# Patient Record
Sex: FEMALE | Race: BLACK OR AFRICAN AMERICAN | NOT HISPANIC OR LATINO | URBAN - METROPOLITAN AREA
[De-identification: names, ages, dates, MRNs, and addresses within clinical notes are randomized per-mention and may not be internally consistent; named-entity substitution may affect disease eponyms.]

---

## 2024-07-03 ENCOUNTER — LAB OUTSIDE AN ENCOUNTER (OUTPATIENT)
Dept: URBAN - METROPOLITAN AREA CLINIC 88 | Facility: CLINIC | Age: 39
End: 2024-07-03

## 2024-07-03 ENCOUNTER — DASHBOARD ENCOUNTERS (OUTPATIENT)
Age: 39
End: 2024-07-03

## 2024-07-03 ENCOUNTER — OFFICE VISIT (OUTPATIENT)
Dept: URBAN - METROPOLITAN AREA CLINIC 88 | Facility: CLINIC | Age: 39
End: 2024-07-03
Payer: COMMERCIAL

## 2024-07-03 VITALS
HEIGHT: 60 IN | TEMPERATURE: 97.9 F | BODY MASS INDEX: 31.06 KG/M2 | DIASTOLIC BLOOD PRESSURE: 88 MMHG | OXYGEN SATURATION: 99 % | HEART RATE: 102 BPM | SYSTOLIC BLOOD PRESSURE: 152 MMHG | WEIGHT: 158.2 LBS

## 2024-07-03 DIAGNOSIS — R10.13 EPIGASTRIC ABDOMINAL PAIN: ICD-10-CM

## 2024-07-03 DIAGNOSIS — R63.4 WEIGHT LOSS, UNINTENTIONAL: ICD-10-CM

## 2024-07-03 DIAGNOSIS — R19.7 DIARRHEA OF PRESUMED INFECTIOUS ORIGIN: ICD-10-CM

## 2024-07-03 DIAGNOSIS — R11.0 NAUSEA: ICD-10-CM

## 2024-07-03 PROCEDURE — 99204 OFFICE O/P NEW MOD 45 MIN: CPT | Performed by: NURSE PRACTITIONER

## 2024-07-03 RX ORDER — ACYCLOVIR 400 MG/1
TAKE 1 TABLET (400 MG TOTAL) BY MOUTH IN THE MORNING AND BEFORE BEDTIME TABLET ORAL
Qty: 180 EACH | Refills: 1 | Status: ACTIVE | COMMUNITY

## 2024-07-03 RX ORDER — MV-MN/C/THEANINE/HERB NO.310 1000-200MG
AS DIRECTED POWDER IN PACKET (EA) ORAL
Status: ACTIVE | COMMUNITY

## 2024-07-03 RX ORDER — PANTOPRAZOLE SODIUM 40 MG/1
TABLET, DELAYED RELEASE ORAL
Qty: 30 TABLET | Status: ACTIVE | COMMUNITY

## 2024-07-03 RX ORDER — MONTELUKAST SODIUM 10 MG/1
TABLET, FILM COATED ORAL
Qty: 90 TABLET | Status: ACTIVE | COMMUNITY

## 2024-07-03 RX ORDER — PROMETHAZINE HYDROCHLORIDE 25 MG/1
TAKE 1 TABLET BY MOUTH EVERY 8 HOURS AS NEEDED TABLET ORAL
Qty: 60 EACH | Refills: 0 | Status: ACTIVE | COMMUNITY

## 2024-07-03 RX ORDER — CLINDAMYCIN PHOSPHATE TOPICAL SOLUTION USP, 1% 10 MG/ML
APPLY TO THE AFFECTED AREAS ON THE FACE ONCE DAILY SOLUTION TOPICAL
Qty: 60 MILLILITER | Refills: 0 | Status: ACTIVE | COMMUNITY

## 2024-07-03 RX ORDER — HYDROCORTISONE 25 MG/G
APPLY TO THE FACE UP TO TWICE PER DAY X2 WEEKS OINTMENT TOPICAL
Qty: 20 GRAM | Refills: 1 | Status: ACTIVE | COMMUNITY

## 2024-07-03 RX ORDER — RIMEGEPANT SULFATE 75 MG/75MG
TABLET, ORALLY DISINTEGRATING ORAL
Qty: 16 EACH | Status: ACTIVE | COMMUNITY

## 2024-07-03 RX ORDER — BUPROPION HYDROCHLORIDE 150 MG/1
TAKE 1 TABLET BY MOUTH EVERY DAY TABLET, FILM COATED, EXTENDED RELEASE ORAL
Qty: 90 EACH | Refills: 1 | Status: ACTIVE | COMMUNITY

## 2024-07-03 RX ORDER — ALBUTEROL SULFATE 90 UG/1
2 PUFFS AS NEEDED INHALATION EVERY 6 HOURS AS NEEDED 30 DAYS AEROSOL, METERED RESPIRATORY (INHALATION)
Qty: 8.5 GRAM | Refills: 0 | Status: ACTIVE | COMMUNITY

## 2024-07-03 NOTE — HPI-TODAY'S VISIT:
39 y.o. presents today for evaluation of various GI complaints.  Diarrhea has been an issue for several years.  Feels the frequency has increased over the year.  Stool consistency varies from type 5 and 6.  Defecation occurs at least 2x/day with occasional urgency.  Denies nocturnal urgency or fecal incontinence.  At times, may experience abdominal pain with defecation.  This may pain ma improve slightly with defecation.  Despite diarrhea, may not experience a complete sense of evacuation of stool.  Has taken Imodium AD in past to manage fecal urgency.  Denies use of medication at this time.  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   Admits to 2 trips to [place] over the last year.  Voices a 20 lb unintentional weight loss over the last year.  Unsure if this is due to her decreased appetite. Also has a hx of migraine headaches with associated nausea.  However, over the last year nausea has become a daily complaint independently of headaches.  Over the last 3 months nausea has been a consistent symptom that starts in the morning.  Vomiting occurs intermittently with or without eating.  Abdominal symptoms are felt primarily to epigastric region.  States pain may improve slightly with defecation.  Celiac test was normal on 0703/204 (viewed on patient's phone).  Denies excessive use of NSAIDs, melena or recent diagnosis of anemia.    Last colonoscopy in 2011 in [place], [place] with normal findings voiced.  Also voiced a hx of rectal prolapse and underwent surgical repair.

## 2024-07-08 LAB
ALBUMIN: 4.3
ALKALINE PHOSPHATASE: 62
ALT (SGPT): 12
AST (SGOT): 14
BASO (ABSOLUTE): 0
BASOS: 0
BILIRUBIN, TOTAL: 0.3
BUN/CREATININE RATIO: 6
BUN: 5
C-REACTIVE PROTEIN, QUANT: 6
CALCIUM: 10.1
CARBON DIOXIDE, TOTAL: 23
CHLORIDE: 102
CREATININE: 0.82
EGFR: 93
EOS (ABSOLUTE): 0.1
EOS: 1
GLOBULIN, TOTAL: 2.7
GLUCOSE: 97
HEMATOCRIT: 41.1
HEMATOLOGY COMMENTS:: (no result)
HEMOGLOBIN: 14.1
IMMATURE CELLS: (no result)
IMMATURE GRANS (ABS): 0
IMMATURE GRANULOCYTES: 0
LYMPHS (ABSOLUTE): 2.2
LYMPHS: 37
MCH: 32
MCHC: 34.3
MCV: 93
MONOCYTES(ABSOLUTE): 0.5
MONOCYTES: 8
NEUTROPHILS (ABSOLUTE): 3.1
NEUTROPHILS: 54
NRBC: (no result)
PLATELETS: 279
POTASSIUM: 4.2
PROTEIN, TOTAL: 7
RBC: 4.41
RDW: 12.2
SEDIMENTATION RATE-WESTERGREN: 7
SODIUM: 140
T4,FREE(DIRECT): 1.04
TSH: 1.3
WBC: 5.9

## 2024-07-10 ENCOUNTER — OFFICE VISIT (OUTPATIENT)
Dept: URBAN - METROPOLITAN AREA SURGERY CENTER 24 | Facility: SURGERY CENTER | Age: 39
End: 2024-07-10
Payer: COMMERCIAL

## 2024-07-10 ENCOUNTER — CLAIMS CREATED FROM THE CLAIM WINDOW (OUTPATIENT)
Dept: URBAN - METROPOLITAN AREA CLINIC 4 | Facility: CLINIC | Age: 39
End: 2024-07-10
Payer: COMMERCIAL

## 2024-07-10 DIAGNOSIS — R11.2 ACUTE NAUSEA WITH NONBILIOUS VOMITING: ICD-10-CM

## 2024-07-10 DIAGNOSIS — R63.4 ABNORMAL INTENTIONAL WEIGHT LOSS: ICD-10-CM

## 2024-07-10 DIAGNOSIS — K31.89 ACHYLIA: ICD-10-CM

## 2024-07-10 DIAGNOSIS — K31.89 OTHER DISEASES OF STOMACH AND DUODENUM: ICD-10-CM

## 2024-07-10 DIAGNOSIS — K63.5 BENIGN COLON POLYP: ICD-10-CM

## 2024-07-10 DIAGNOSIS — K51.40 INFLAMMATORY POLYPS OF COLON WITHOUT COMPLICATIONS: ICD-10-CM

## 2024-07-10 DIAGNOSIS — K21.9 ACID REFLUX: ICD-10-CM

## 2024-07-10 DIAGNOSIS — K21.9 GASTRO-ESOPHAGEAL REFLUX DISEASE WITHOUT ESOPHAGITIS: ICD-10-CM

## 2024-07-10 DIAGNOSIS — R63.4 ABNORMAL WEIGHT LOSS: ICD-10-CM

## 2024-07-10 PROCEDURE — 88305 TISSUE EXAM BY PATHOLOGIST: CPT | Performed by: PATHOLOGY

## 2024-07-10 PROCEDURE — 43239 EGD BIOPSY SINGLE/MULTIPLE: CPT | Performed by: INTERNAL MEDICINE

## 2024-07-10 PROCEDURE — 45380 COLONOSCOPY AND BIOPSY: CPT | Performed by: INTERNAL MEDICINE

## 2024-07-10 PROCEDURE — 88312 SPECIAL STAINS GROUP 1: CPT | Performed by: PATHOLOGY

## 2024-07-10 PROCEDURE — 45385 COLONOSCOPY W/LESION REMOVAL: CPT | Performed by: INTERNAL MEDICINE

## 2024-07-10 PROCEDURE — 00813 ANES UPR LWR GI NDSC PX: CPT | Performed by: NURSE ANESTHETIST, CERTIFIED REGISTERED

## 2024-07-10 RX ORDER — BUPROPION HYDROCHLORIDE 150 MG/1
TAKE 1 TABLET BY MOUTH EVERY DAY TABLET, FILM COATED, EXTENDED RELEASE ORAL
Qty: 90 EACH | Refills: 1 | Status: ACTIVE | COMMUNITY

## 2024-07-10 RX ORDER — RIMEGEPANT SULFATE 75 MG/75MG
TABLET, ORALLY DISINTEGRATING ORAL
Qty: 16 EACH | Status: ACTIVE | COMMUNITY

## 2024-07-10 RX ORDER — HYDROCORTISONE 25 MG/G
APPLY TO THE FACE UP TO TWICE PER DAY X2 WEEKS OINTMENT TOPICAL
Qty: 20 GRAM | Refills: 1 | Status: ACTIVE | COMMUNITY

## 2024-07-10 RX ORDER — MV-MN/C/THEANINE/HERB NO.310 1000-200MG
AS DIRECTED POWDER IN PACKET (EA) ORAL
Status: ACTIVE | COMMUNITY

## 2024-07-10 RX ORDER — MONTELUKAST SODIUM 10 MG/1
TABLET, FILM COATED ORAL
Qty: 90 TABLET | Status: ACTIVE | COMMUNITY

## 2024-07-10 RX ORDER — ACYCLOVIR 400 MG/1
TAKE 1 TABLET (400 MG TOTAL) BY MOUTH IN THE MORNING AND BEFORE BEDTIME TABLET ORAL
Qty: 180 EACH | Refills: 1 | Status: ACTIVE | COMMUNITY

## 2024-07-10 RX ORDER — ALBUTEROL SULFATE 90 UG/1
2 PUFFS AS NEEDED INHALATION EVERY 6 HOURS AS NEEDED 30 DAYS AEROSOL, METERED RESPIRATORY (INHALATION)
Qty: 8.5 GRAM | Refills: 0 | Status: ACTIVE | COMMUNITY

## 2024-07-10 RX ORDER — PANTOPRAZOLE SODIUM 40 MG/1
TABLET, DELAYED RELEASE ORAL
Qty: 30 TABLET | Status: ACTIVE | COMMUNITY

## 2024-07-10 RX ORDER — PROMETHAZINE HYDROCHLORIDE 25 MG/1
TAKE 1 TABLET BY MOUTH EVERY 8 HOURS AS NEEDED TABLET ORAL
Qty: 60 EACH | Refills: 0 | Status: ACTIVE | COMMUNITY

## 2024-07-10 RX ORDER — CLINDAMYCIN PHOSPHATE TOPICAL SOLUTION USP, 1% 10 MG/ML
APPLY TO THE AFFECTED AREAS ON THE FACE ONCE DAILY SOLUTION TOPICAL
Qty: 60 MILLILITER | Refills: 0 | Status: ACTIVE | COMMUNITY

## 2024-07-16 LAB
C DIFFICILE TOXINS A+B, EIA: NEGATIVE
C DIFFICILE, CYTOTOXIN B: (no result)
CALPROTECTIN, FECAL: 37
Lab: (no result)
PANCREATIC ELASTASE, FECAL: >800

## 2024-07-23 ENCOUNTER — OFFICE VISIT (OUTPATIENT)
Dept: URBAN - METROPOLITAN AREA CLINIC 88 | Facility: CLINIC | Age: 39
End: 2024-07-23

## 2024-08-22 ENCOUNTER — OFFICE VISIT (OUTPATIENT)
Dept: URBAN - METROPOLITAN AREA CLINIC 88 | Facility: CLINIC | Age: 39
End: 2024-08-22
Payer: COMMERCIAL

## 2024-08-22 ENCOUNTER — LAB OUTSIDE AN ENCOUNTER (OUTPATIENT)
Dept: URBAN - METROPOLITAN AREA CLINIC 88 | Facility: CLINIC | Age: 39
End: 2024-08-22

## 2024-08-22 VITALS
HEART RATE: 81 BPM | DIASTOLIC BLOOD PRESSURE: 94 MMHG | OXYGEN SATURATION: 99 % | BODY MASS INDEX: 29.06 KG/M2 | HEIGHT: 60 IN | SYSTOLIC BLOOD PRESSURE: 137 MMHG | TEMPERATURE: 98.1 F | WEIGHT: 148 LBS

## 2024-08-22 DIAGNOSIS — R11.0 NAUSEA: ICD-10-CM

## 2024-08-22 DIAGNOSIS — K58.2 MIXED IRRITABLE BOWEL SYNDROME: ICD-10-CM

## 2024-08-22 DIAGNOSIS — K21.9 ACID REFLUX: ICD-10-CM

## 2024-08-22 DIAGNOSIS — R63.4 WEIGHT LOSS, UNINTENTIONAL: ICD-10-CM

## 2024-08-22 PROBLEM — 440544005: Status: ACTIVE | Noted: 2024-08-22

## 2024-08-22 PROBLEM — 266433003: Status: ACTIVE | Noted: 2024-08-22

## 2024-08-22 PROCEDURE — 99213 OFFICE O/P EST LOW 20 MIN: CPT | Performed by: NURSE PRACTITIONER

## 2024-08-22 RX ORDER — PROPRANOLOL HYDROCHLORIDE 10 MG/1
1 TABLET TABLET ORAL THREE TIMES A DAY
Status: ACTIVE | COMMUNITY

## 2024-08-22 RX ORDER — MV-MN/C/THEANINE/HERB NO.310 1000-200MG
AS DIRECTED POWDER IN PACKET (EA) ORAL
Status: ACTIVE | COMMUNITY

## 2024-08-22 RX ORDER — ACYCLOVIR 400 MG/1
TAKE 1 TABLET (400 MG TOTAL) BY MOUTH IN THE MORNING AND BEFORE BEDTIME TABLET ORAL
Qty: 180 EACH | Refills: 1 | Status: ACTIVE | COMMUNITY

## 2024-08-22 RX ORDER — PROMETHAZINE HYDROCHLORIDE 25 MG/1
TAKE 1 TABLET BY MOUTH EVERY 8 HOURS AS NEEDED TABLET ORAL
Qty: 60 EACH | Refills: 0 | Status: ACTIVE | COMMUNITY

## 2024-08-22 RX ORDER — PANTOPRAZOLE SODIUM 40 MG/1
TABLET, DELAYED RELEASE ORAL
Qty: 30 TABLET | Status: ACTIVE | COMMUNITY

## 2024-08-22 RX ORDER — LORATADINE 10 MG/1
1 TABLET TABLET ORAL ONCE A DAY
Status: ACTIVE | COMMUNITY

## 2024-08-22 RX ORDER — ROPINIROLE HYDROCHLORIDE 1 MG/1
1 TABLET 1 TO 3 HOURS BEFORE BEDTIME TABLET, FILM COATED ORAL ONCE A DAY
Status: ACTIVE | COMMUNITY

## 2024-08-22 RX ORDER — HYDROCORTISONE 25 MG/G
APPLY TO THE FACE UP TO TWICE PER DAY X2 WEEKS OINTMENT TOPICAL
Qty: 20 GRAM | Refills: 1 | Status: ACTIVE | COMMUNITY

## 2024-08-22 RX ORDER — CIMETIDINE 300 MG/1
1 TABLET WITH MEALS TABLET, FILM COATED ORAL TWICE A DAY
Status: ACTIVE | COMMUNITY

## 2024-08-22 RX ORDER — BUPROPION HYDROCHLORIDE 150 MG/1
TAKE 1 TABLET BY MOUTH EVERY DAY TABLET, FILM COATED, EXTENDED RELEASE ORAL
Qty: 90 EACH | Refills: 1 | Status: ACTIVE | COMMUNITY

## 2024-08-22 RX ORDER — TRETIONIN 0.25 MG/G
1 APPLICATION IN THE EVENING TO FACE CREAM TOPICAL ONCE A DAY
Status: ACTIVE | COMMUNITY

## 2024-08-22 RX ORDER — ALBUTEROL SULFATE 90 UG/1
2 PUFFS AS NEEDED INHALATION EVERY 6 HOURS AS NEEDED 30 DAYS AEROSOL, METERED RESPIRATORY (INHALATION)
Qty: 8.5 GRAM | Refills: 0 | Status: ACTIVE | COMMUNITY

## 2024-08-22 RX ORDER — CLINDAMYCIN PHOSPHATE TOPICAL SOLUTION USP, 1% 10 MG/ML
APPLY TO THE AFFECTED AREAS ON THE FACE ONCE DAILY SOLUTION TOPICAL
Qty: 60 MILLILITER | Refills: 0 | Status: DISCONTINUED | COMMUNITY

## 2024-08-22 RX ORDER — MONTELUKAST SODIUM 10 MG/1
TABLET, FILM COATED ORAL
Qty: 90 TABLET | Status: ACTIVE | COMMUNITY

## 2024-08-22 RX ORDER — RIMEGEPANT SULFATE 75 MG/75MG
TABLET, ORALLY DISINTEGRATING ORAL
Qty: 16 EACH | Status: ACTIVE | COMMUNITY

## 2024-08-22 NOTE — HPI-OTHER HISTORIES
- - - - - - - - - - - - - - - - - - - - - - - - - - - - - - -- Office note 07/03/2024: 39 y.o. presents today for evaluation of various GI complaints. Diarrhea has been an issue for several years. Feels the frequency has increased over the year. Stool consistency varies from type 5 and 6. Defecation occurs at least 2x/day with occasional urgency. Denies nocturnal urgency or fecal incontinence. At times, may experience abdominal pain with defecation. This may pain ma improve slightly with defecation. Despite diarrhea, may not experience a complete sense of evacuation of stool. Has taken Imodium AD in past to manage fecal urgency. Denies use of medication at this time. 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 Admits to 2 trips to [place] over the last year.  Voices a 20 lb unintentional weight loss over the last year. Unsure if this is due to her decreased appetite. Also has a hx of migraine headaches with associated nausea. However, over the last year nausea has become a daily complaint independently of headaches. Over the last 3 months nausea has been a consistent symptom that starts in the morning. Vomiting occurs intermittently with or without eating. Abdominal symptoms are felt primarily to epigastric region. States pain may improve slightly with defecation. Celiac test was normal on 0703/204 (viewed on patient's phone). Denies excessive use of NSAIDs, melena or recent diagnosis of anemia.  Last colonoscopy in 2011 in [place], [place] with normal findings voiced. Also voiced a hx of rectal prolapse and underwent surgical repair.s

## 2024-08-22 NOTE — PHYSICAL EXAM GASTROINTESTINAL
Abdomen , soft, LUQ tenderness, nondistended , no guarding or rigidity , no masses palpable , normal bowel sounds , Liver and Spleen:  no hepatosplenomegaly

## 2024-08-22 NOTE — HPI-TODAY'S VISIT:
Presents today for follow up after undergoing EGD and colonoscopy procedure by Dr. Carter on 07/10/2024 for evaluation of c/co diarrhea, unexplained weight loss and nausea. EGD:  Reflux esophagitis (neg Soriano's/EoE), non-erosive gastritis (neg h. pylori) and normal duodenum (bx benign) Colonoscopy:  2 inflammatory pseudopolyps.  Continues to voice intermittent diarrhea alternating constipation.  Voices onset of constipation immediately after her colonoscopy.  Stools eventually became semi-formed and loose in consistency.  At this time, defecation is occuring at least 1-2 times a day.  However, defecation does not occur on daily eleazar.  Abdominal discomfort does improve with defecation.    Describes her appetite as being fair.  Feels she has lost the desire to eat.   Reports smaller portion sized meals due to this.  Weight loss continues in this setting.  Patient admits to increase stress and anxiety during this time.  Most recently is her father's terminal illness.

## 2024-08-26 ENCOUNTER — TELEPHONE ENCOUNTER (OUTPATIENT)
Dept: URBAN - METROPOLITAN AREA CLINIC 23 | Facility: CLINIC | Age: 39
End: 2024-08-26

## 2024-09-09 ENCOUNTER — TELEPHONE ENCOUNTER (OUTPATIENT)
Dept: URBAN - METROPOLITAN AREA CLINIC 70 | Facility: CLINIC | Age: 39
End: 2024-09-09

## 2024-09-09 ENCOUNTER — LAB OUTSIDE AN ENCOUNTER (OUTPATIENT)
Dept: URBAN - METROPOLITAN AREA CLINIC 70 | Facility: CLINIC | Age: 39
End: 2024-09-09

## 2024-09-09 PROBLEM — 35298007: Status: ACTIVE | Noted: 2024-09-09

## 2024-09-09 RX ORDER — POLYETHYLENE GLYCOL 3350, SODIUM CHLORIDE, SODIUM BICARBONATE, POTASSIUM CHLORIDE 420; 11.2; 5.72; 1.48 G/4L; G/4L; G/4L; G/4L
MIX AS DIRECTED.  DRINK ONE CUP EVERY 15 MINUTES UNTIL COMPLETE FOR BOWEL CLEANSING POWDER, FOR SOLUTION ORAL ONCE
Qty: 1 | Refills: 0 | OUTPATIENT
Start: 2024-09-09 | End: 2024-09-10

## 2024-10-02 ENCOUNTER — TELEPHONE ENCOUNTER (OUTPATIENT)
Dept: URBAN - METROPOLITAN AREA CLINIC 70 | Facility: CLINIC | Age: 39
End: 2024-10-02

## 2024-10-02 ENCOUNTER — LAB OUTSIDE AN ENCOUNTER (OUTPATIENT)
Dept: URBAN - METROPOLITAN AREA CLINIC 70 | Facility: CLINIC | Age: 39
End: 2024-10-02

## 2024-10-08 ENCOUNTER — OFFICE VISIT (OUTPATIENT)
Dept: URBAN - METROPOLITAN AREA CLINIC 88 | Facility: CLINIC | Age: 39
End: 2024-10-08

## 2024-10-08 RX ORDER — PROMETHAZINE HYDROCHLORIDE 25 MG/1
TAKE 1 TABLET BY MOUTH EVERY 8 HOURS AS NEEDED TABLET ORAL
Qty: 60 EACH | Refills: 0 | Status: ACTIVE | COMMUNITY

## 2024-10-08 RX ORDER — TRETIONIN 0.25 MG/G
1 APPLICATION IN THE EVENING TO FACE CREAM TOPICAL ONCE A DAY
Status: ACTIVE | COMMUNITY

## 2024-10-08 RX ORDER — LORATADINE 10 MG/1
1 TABLET TABLET ORAL ONCE A DAY
Status: ACTIVE | COMMUNITY

## 2024-10-08 RX ORDER — ALBUTEROL SULFATE 90 UG/1
2 PUFFS AS NEEDED INHALATION EVERY 6 HOURS AS NEEDED 30 DAYS AEROSOL, METERED RESPIRATORY (INHALATION)
Qty: 8.5 GRAM | Refills: 0 | Status: ACTIVE | COMMUNITY

## 2024-10-08 RX ORDER — CIMETIDINE 300 MG/1
1 TABLET WITH MEALS TABLET, FILM COATED ORAL TWICE A DAY
Status: ACTIVE | COMMUNITY

## 2024-10-08 RX ORDER — ROPINIROLE HYDROCHLORIDE 1 MG/1
1 TABLET 1 TO 3 HOURS BEFORE BEDTIME TABLET, FILM COATED ORAL ONCE A DAY
Status: ACTIVE | COMMUNITY

## 2024-10-08 RX ORDER — MV-MN/C/THEANINE/HERB NO.310 1000-200MG
AS DIRECTED POWDER IN PACKET (EA) ORAL
Status: ACTIVE | COMMUNITY

## 2024-10-08 RX ORDER — PANTOPRAZOLE SODIUM 40 MG/1
TABLET, DELAYED RELEASE ORAL
Qty: 30 TABLET | Status: ACTIVE | COMMUNITY

## 2024-10-08 RX ORDER — ACYCLOVIR 400 MG/1
TAKE 1 TABLET (400 MG TOTAL) BY MOUTH IN THE MORNING AND BEFORE BEDTIME TABLET ORAL
Qty: 180 EACH | Refills: 1 | Status: ACTIVE | COMMUNITY

## 2024-10-08 RX ORDER — BUPROPION HYDROCHLORIDE 150 MG/1
TAKE 1 TABLET BY MOUTH EVERY DAY TABLET, FILM COATED, EXTENDED RELEASE ORAL
Qty: 90 EACH | Refills: 1 | Status: ACTIVE | COMMUNITY

## 2024-10-08 RX ORDER — MONTELUKAST SODIUM 10 MG/1
TABLET, FILM COATED ORAL
Qty: 90 TABLET | Status: ACTIVE | COMMUNITY

## 2024-10-08 RX ORDER — RIMEGEPANT SULFATE 75 MG/75MG
TABLET, ORALLY DISINTEGRATING ORAL
Qty: 16 EACH | Status: ACTIVE | COMMUNITY

## 2024-10-08 RX ORDER — HYDROCORTISONE 25 MG/G
APPLY TO THE FACE UP TO TWICE PER DAY X2 WEEKS OINTMENT TOPICAL
Qty: 20 GRAM | Refills: 1 | Status: ACTIVE | COMMUNITY

## 2024-10-08 RX ORDER — PROPRANOLOL HYDROCHLORIDE 10 MG/1
1 TABLET TABLET ORAL THREE TIMES A DAY
Status: ACTIVE | COMMUNITY

## 2024-10-17 ENCOUNTER — OFFICE VISIT (OUTPATIENT)
Dept: URBAN - METROPOLITAN AREA CLINIC 88 | Facility: CLINIC | Age: 39
End: 2024-10-17
Payer: COMMERCIAL

## 2024-10-17 VITALS
HEART RATE: 80 BPM | TEMPERATURE: 98.4 F | SYSTOLIC BLOOD PRESSURE: 146 MMHG | OXYGEN SATURATION: 100 % | WEIGHT: 141.4 LBS | HEIGHT: 60 IN | BODY MASS INDEX: 27.76 KG/M2 | DIASTOLIC BLOOD PRESSURE: 90 MMHG

## 2024-10-17 DIAGNOSIS — R63.4 WEIGHT LOSS, UNINTENTIONAL: ICD-10-CM

## 2024-10-17 DIAGNOSIS — K21.00 GASTROESOPHAGEAL REFLUX DISEASE WITH ESOPHAGITIS WITHOUT HEMORRHAGE: ICD-10-CM

## 2024-10-17 DIAGNOSIS — K31.84 NONDIABETIC GASTROPARESIS: ICD-10-CM

## 2024-10-17 DIAGNOSIS — K58.2 MIXED IRRITABLE BOWEL SYNDROME: ICD-10-CM

## 2024-10-17 PROBLEM — 75994008: Status: ACTIVE | Noted: 2024-10-17

## 2024-10-17 PROCEDURE — 99214 OFFICE O/P EST MOD 30 MIN: CPT | Performed by: NURSE PRACTITIONER

## 2024-10-17 RX ORDER — ACYCLOVIR 400 MG/1
TAKE 1 TABLET (400 MG TOTAL) BY MOUTH IN THE MORNING AND BEFORE BEDTIME TABLET ORAL
Qty: 180 EACH | Refills: 1 | Status: ACTIVE | COMMUNITY

## 2024-10-17 RX ORDER — METOCLOPRAMIDE 10 MG/1
1 TABLET TABLET ORAL
Qty: 90 | Refills: 1 | OUTPATIENT
Start: 2024-10-17

## 2024-10-17 RX ORDER — PANTOPRAZOLE SODIUM 40 MG/1
TABLET, DELAYED RELEASE ORAL
Qty: 30 TABLET | Status: ACTIVE | COMMUNITY

## 2024-10-17 RX ORDER — MONTELUKAST SODIUM 10 MG/1
TABLET, FILM COATED ORAL
Qty: 90 TABLET | Status: ACTIVE | COMMUNITY

## 2024-10-17 RX ORDER — LORATADINE 10 MG/1
1 TABLET TABLET ORAL ONCE A DAY
Status: ACTIVE | COMMUNITY

## 2024-10-17 RX ORDER — TRETIONIN 0.25 MG/G
1 APPLICATION IN THE EVENING TO FACE CREAM TOPICAL ONCE A DAY
Status: ACTIVE | COMMUNITY

## 2024-10-17 RX ORDER — PROPRANOLOL HYDROCHLORIDE 10 MG/1
1 TABLET TABLET ORAL THREE TIMES A DAY
Status: ACTIVE | COMMUNITY

## 2024-10-17 RX ORDER — ROPINIROLE HYDROCHLORIDE 1 MG/1
1 TABLET 1 TO 3 HOURS BEFORE BEDTIME TABLET, FILM COATED ORAL ONCE A DAY
Status: ACTIVE | COMMUNITY

## 2024-10-17 RX ORDER — RIMEGEPANT SULFATE 75 MG/75MG
TABLET, ORALLY DISINTEGRATING ORAL
Qty: 16 EACH | Status: ACTIVE | COMMUNITY

## 2024-10-17 RX ORDER — ALBUTEROL SULFATE 90 UG/1
2 PUFFS AS NEEDED INHALATION EVERY 6 HOURS AS NEEDED 30 DAYS AEROSOL, METERED RESPIRATORY (INHALATION)
Qty: 8.5 GRAM | Refills: 0 | Status: ACTIVE | COMMUNITY

## 2024-10-17 RX ORDER — CIMETIDINE 300 MG/1
1 TABLET WITH MEALS TABLET, FILM COATED ORAL TWICE A DAY
Status: ACTIVE | COMMUNITY

## 2024-10-17 RX ORDER — HYDROCORTISONE 25 MG/G
APPLY TO THE FACE UP TO TWICE PER DAY X2 WEEKS OINTMENT TOPICAL
Qty: 20 GRAM | Refills: 1 | Status: ACTIVE | COMMUNITY

## 2024-10-17 RX ORDER — MV-MN/C/THEANINE/HERB NO.310 1000-200MG
AS DIRECTED POWDER IN PACKET (EA) ORAL
Status: ACTIVE | COMMUNITY

## 2024-10-17 RX ORDER — PROMETHAZINE HYDROCHLORIDE 25 MG/1
TAKE 1 TABLET BY MOUTH EVERY 8 HOURS AS NEEDED TABLET ORAL
Qty: 60 EACH | Refills: 0 | Status: ACTIVE | COMMUNITY

## 2024-10-17 RX ORDER — BUPROPION HYDROCHLORIDE 150 MG/1
TAKE 1 TABLET BY MOUTH EVERY DAY TABLET, FILM COATED, EXTENDED RELEASE ORAL
Qty: 90 EACH | Refills: 1 | Status: ACTIVE | COMMUNITY

## 2024-10-17 NOTE — HPI-OTHER HISTORIES
- - - - - - - - - - - - - - - - - - - - - - - - - - - - - - -- Office note 08/22/2024: Presents today for follow up after undergoing EGD and colonoscopy procedure by Dr. Carter on 07/10/2024 for evaluation of c/co diarrhea, unexplained weight loss and nausea. EGD:  Reflux esophagitis (neg Soriano's/EoE), non-erosive gastritis (neg h. pylori) and normal duodenum (bx benign) Colonoscopy:  2 inflammatory pseudopolyps.  Continues to voice intermittent diarrhea alternating constipation.  Voices onset of constipation immediately after her colonoscopy.  Stools eventually became semi-formed and loose in consistency.  At this time, defecation is occuring at least 1-2 times a day.  However, defecation does not occur on daily eleazar.  Abdominal discomfort does improve with defecation.    Describes her appetite as being fair.  Feels she has lost the desire to eat.   Reports smaller portion sized meals due to this.  Weight loss continues in this setting.  Patient admits to increase stress and anxiety during this time.  Most recently is her father's terminal illness.

## 2024-10-17 NOTE — HPI-TODAY'S VISIT:
Patient presents today for follow up regarding weight loss, nausea and abdominal pain.    CT A/P on 09/05/2024 revealed 1.5 cm lytic lesion in left side of body, increased stool burden in right colon, left ovarian cyst 3.3 cm. Subsequent NM bone scan performed on 09/26/2024  Due to continued c/o nausea, epigastric pain and weight loss, gastric emptying study ordered.  Imaging performed on 10/15/2024 consistent with gastroparesis.  Patient does not have diagnosis of IBS, dentes recent viral illness or use of GLP-1 medications.  Defecation occurs every 1-2 days.  Despite defecation, she fails to experience a complete sense of evacuation of stool.  Abdominal discomfort does improve slightly with defecation.     Patient admits to increase stress and anxiety during this time.  Most recently is her father's terminal illness.

## 2024-11-18 ENCOUNTER — OFFICE VISIT (OUTPATIENT)
Dept: URBAN - METROPOLITAN AREA CLINIC 88 | Facility: CLINIC | Age: 39
End: 2024-11-18
Payer: COMMERCIAL

## 2024-11-18 VITALS
HEART RATE: 73 BPM | HEIGHT: 60 IN | OXYGEN SATURATION: 100 % | BODY MASS INDEX: 28.23 KG/M2 | DIASTOLIC BLOOD PRESSURE: 91 MMHG | WEIGHT: 143.8 LBS | TEMPERATURE: 97.9 F | SYSTOLIC BLOOD PRESSURE: 137 MMHG

## 2024-11-18 DIAGNOSIS — K58.2 MIXED IRRITABLE BOWEL SYNDROME: ICD-10-CM

## 2024-11-18 DIAGNOSIS — K31.84 NONDIABETIC GASTROPARESIS: ICD-10-CM

## 2024-11-18 DIAGNOSIS — K21.00 GASTROESOPHAGEAL REFLUX DISEASE WITH ESOPHAGITIS WITHOUT HEMORRHAGE: ICD-10-CM

## 2024-11-18 DIAGNOSIS — R63.4 WEIGHT LOSS, UNINTENTIONAL: ICD-10-CM

## 2024-11-18 PROCEDURE — 99214 OFFICE O/P EST MOD 30 MIN: CPT | Performed by: NURSE PRACTITIONER

## 2024-11-18 RX ORDER — MV-MN/C/THEANINE/HERB NO.310 1000-200MG
AS DIRECTED POWDER IN PACKET (EA) ORAL
Status: ACTIVE | COMMUNITY

## 2024-11-18 RX ORDER — ROPINIROLE HYDROCHLORIDE 1 MG/1
1 TABLET 1 TO 3 HOURS BEFORE BEDTIME TABLET, FILM COATED ORAL ONCE A DAY
Status: ACTIVE | COMMUNITY

## 2024-11-18 RX ORDER — PANTOPRAZOLE SODIUM 40 MG/1
TABLET, DELAYED RELEASE ORAL
Qty: 30 TABLET | Status: ACTIVE | COMMUNITY

## 2024-11-18 RX ORDER — PROMETHAZINE HYDROCHLORIDE 25 MG/1
TAKE 1 TABLET BY MOUTH EVERY 8 HOURS AS NEEDED TABLET ORAL
Qty: 60 EACH | Refills: 0 | Status: ACTIVE | COMMUNITY

## 2024-11-18 RX ORDER — ALBUTEROL SULFATE 90 UG/1
2 PUFFS AS NEEDED INHALATION EVERY 6 HOURS AS NEEDED 30 DAYS AEROSOL, METERED RESPIRATORY (INHALATION)
Qty: 8.5 GRAM | Refills: 0 | Status: ACTIVE | COMMUNITY

## 2024-11-18 RX ORDER — METOCLOPRAMIDE 10 MG/1
1 TABLET TABLET ORAL
Qty: 90 | Refills: 1 | Status: ACTIVE | COMMUNITY
Start: 2024-10-17

## 2024-11-18 RX ORDER — MONTELUKAST SODIUM 10 MG/1
TABLET, FILM COATED ORAL
Qty: 90 TABLET | Status: ACTIVE | COMMUNITY

## 2024-11-18 RX ORDER — TRETIONIN 0.25 MG/G
1 APPLICATION IN THE EVENING TO FACE CREAM TOPICAL ONCE A DAY
Status: ACTIVE | COMMUNITY

## 2024-11-18 RX ORDER — LORATADINE 10 MG/1
1 TABLET TABLET ORAL ONCE A DAY
Status: ACTIVE | COMMUNITY

## 2024-11-18 RX ORDER — PROPRANOLOL HYDROCHLORIDE 20 MG/1
1 TABLET TABLET ORAL TWICE A DAY
Status: ACTIVE | COMMUNITY

## 2024-11-18 RX ORDER — GALCANEZUMAB 120 MG/ML
AS DIRECTED INJECTION, SOLUTION SUBCUTANEOUS
Status: ACTIVE | COMMUNITY

## 2024-11-18 RX ORDER — HYDROCORTISONE 25 MG/G
APPLY TO THE FACE UP TO TWICE PER DAY X2 WEEKS OINTMENT TOPICAL
Qty: 20 GRAM | Refills: 1 | Status: ACTIVE | COMMUNITY

## 2024-11-18 RX ORDER — CIMETIDINE 300 MG/1
1 TABLET WITH MEALS TABLET, FILM COATED ORAL TWICE A DAY
Status: ACTIVE | COMMUNITY

## 2024-11-18 RX ORDER — ACYCLOVIR 400 MG/1
TAKE 1 TABLET (400 MG TOTAL) BY MOUTH IN THE MORNING AND BEFORE BEDTIME TABLET ORAL
Qty: 180 EACH | Refills: 1 | Status: ACTIVE | COMMUNITY

## 2024-11-18 RX ORDER — METOCLOPRAMIDE 10 MG/1
1 TABLET TABLET ORAL
Qty: 180 | Refills: 1 | OUTPATIENT

## 2024-11-18 RX ORDER — RIMEGEPANT SULFATE 75 MG/75MG
TABLET, ORALLY DISINTEGRATING ORAL
Qty: 16 EACH | Status: ACTIVE | COMMUNITY

## 2024-11-18 RX ORDER — BUPROPION HYDROCHLORIDE 150 MG/1
TAKE 1 TABLET BY MOUTH EVERY DAY TABLET, FILM COATED, EXTENDED RELEASE ORAL
Qty: 90 EACH | Refills: 1 | Status: ACTIVE | COMMUNITY

## 2024-11-18 NOTE — HPI-TODAY'S VISIT:
Presents today for follow up regarding new diagnosis of gastroparesis.  Started metoclopramide after LOV.  Dto concern of possible tardive dyskinesia, takes medication as needed or once every 2-3 days.  She reports improvement in overall appetite and that early satiety sensation is gradually improving. Denies c/o nausea, vomiting or epigastric abd pain.  Luis GES on 10/15/2024 consistent with gastroparesis. Patient does not have diagnosis of IBS, dentes recent viral illness or use of GLP-1 medications.  Gradually improvement in weight also noted.  Manages her constipation with use of prn use of Miralax.    Is currently c/o shortness of breath at night.  She has a hx of asthma and has been using her rescue inhaler daily over the last 4-6 weeks.

## 2024-11-18 NOTE — HPI-OTHER HISTORIES
- - - - - - - - - - - - - - - - - - - - - - - - - - - - - - Office note 10/17/2024: Patient presents today for follow up regarding weight loss, nausea and abdominal pain.   CT A/P on 09/05/2024 revealed 1.5 cm lytic lesion in left side of body, increased stool burden in right colon, left ovarian cyst 3.3 cm. Subsequent NM bone scan performed on 09/26/2024  Due to continued c/o nausea, epigastric pain and weight loss, gastric emptying study ordered. Imaging performed on 10/15/2024 consistent with gastroparesis. Patient does not have diagnosis of IBS, dentes recent viral illness or use of GLP-1 medications.  Defecation occurs every 1-2 days. Despite defecation, she fails to experience a complete sense of evacuation of stool.  Abdominal discomfort does improve slightly with defecation.  Patient admits to increase stress and anxiety during this time. Most recently is her father's terminal illness.

## 2024-12-05 ENCOUNTER — ERX REFILL RESPONSE (OUTPATIENT)
Dept: URBAN - METROPOLITAN AREA CLINIC 70 | Facility: CLINIC | Age: 39
End: 2024-12-05

## 2024-12-05 RX ORDER — PANTOPRAZOLE SODIUM 40 MG/1
1 TABLET TABLET, DELAYED RELEASE ORAL
Qty: 90 TABLET | Refills: 1 | OUTPATIENT

## 2024-12-05 RX ORDER — PANTOPRAZOLE SODIUM 40 MG/1
TABLET, DELAYED RELEASE ORAL
Qty: 30 TABLET | OUTPATIENT

## 2025-03-05 ENCOUNTER — OFFICE VISIT (OUTPATIENT)
Dept: URBAN - METROPOLITAN AREA CLINIC 88 | Facility: CLINIC | Age: 40
End: 2025-03-05

## 2025-07-01 ENCOUNTER — ERX REFILL RESPONSE (OUTPATIENT)
Dept: URBAN - METROPOLITAN AREA CLINIC 70 | Facility: CLINIC | Age: 40
End: 2025-07-01

## 2025-07-01 RX ORDER — PANTOPRAZOLE SODIUM 40 MG/1
TAKE 1 TABLET BY MOUTH EVERY DAY FOR REFLUX TABLET, DELAYED RELEASE ORAL
Qty: 90 TABLET | Refills: 1 | OUTPATIENT

## 2025-07-01 RX ORDER — PANTOPRAZOLE SODIUM 40 MG/1
1 TABLET TABLET, DELAYED RELEASE ORAL
Qty: 90 TABLET | Refills: 1 | OUTPATIENT